# Patient Record
Sex: MALE | Race: WHITE
[De-identification: names, ages, dates, MRNs, and addresses within clinical notes are randomized per-mention and may not be internally consistent; named-entity substitution may affect disease eponyms.]

---

## 2019-03-28 ENCOUNTER — HOSPITAL ENCOUNTER (EMERGENCY)
Dept: HOSPITAL 33 - ED | Age: 44
Discharge: HOME | End: 2019-03-28
Payer: COMMERCIAL

## 2019-03-28 VITALS — OXYGEN SATURATION: 95 %

## 2019-03-28 VITALS — DIASTOLIC BLOOD PRESSURE: 73 MMHG | HEART RATE: 87 BPM | SYSTOLIC BLOOD PRESSURE: 144 MMHG

## 2019-03-28 DIAGNOSIS — E78.00: ICD-10-CM

## 2019-03-28 DIAGNOSIS — I10: ICD-10-CM

## 2019-03-28 DIAGNOSIS — S00.93XA: Primary | ICD-10-CM
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
## 2019-03-28 DIAGNOSIS — S01.81XA: ICD-10-CM

## 2019-03-28 DIAGNOSIS — R58: ICD-10-CM

## 2019-03-28 PROCEDURE — 70450 CT HEAD/BRAIN W/O DYE: CPT

## 2019-03-28 PROCEDURE — 99283 EMERGENCY DEPT VISIT LOW MDM: CPT
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
## 2019-03-29 NOTE — XRAY
Indication: Right head injury with pain and bruising.



Multiple contiguous axial images obtained through the head without contrast.



Comparison: None



Normal appearing brain parenchyma, ventricles, and bony calvarium.  Visualized

paranasal sinuses and mastoid air cells are clear.



Impression: Normal CT head without contrast exam.



CT DI 50.26